# Patient Record
Sex: MALE | Race: WHITE
[De-identification: names, ages, dates, MRNs, and addresses within clinical notes are randomized per-mention and may not be internally consistent; named-entity substitution may affect disease eponyms.]

---

## 2017-10-10 ENCOUNTER — HOSPITAL ENCOUNTER (EMERGENCY)
Dept: HOSPITAL 47 - EC | Age: 22
Discharge: HOME | End: 2017-10-10
Payer: COMMERCIAL

## 2017-10-10 VITALS
RESPIRATION RATE: 18 BRPM | SYSTOLIC BLOOD PRESSURE: 107 MMHG | HEART RATE: 59 BPM | TEMPERATURE: 97.4 F | DIASTOLIC BLOOD PRESSURE: 71 MMHG

## 2017-10-10 DIAGNOSIS — F17.200: ICD-10-CM

## 2017-10-10 DIAGNOSIS — T81.33XA: Primary | ICD-10-CM

## 2017-10-10 DIAGNOSIS — Z88.0: ICD-10-CM

## 2017-10-10 PROCEDURE — 99282 EMERGENCY DEPT VISIT SF MDM: CPT
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
## 2018-02-06 ENCOUNTER — HOSPITAL ENCOUNTER (EMERGENCY)
Dept: HOSPITAL 47 - EC | Age: 23
Discharge: HOME | End: 2018-02-06
Payer: COMMERCIAL

## 2018-02-06 VITALS
SYSTOLIC BLOOD PRESSURE: 121 MMHG | TEMPERATURE: 97 F | HEART RATE: 96 BPM | DIASTOLIC BLOOD PRESSURE: 69 MMHG | RESPIRATION RATE: 16 BRPM
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
## 2018-02-06 DIAGNOSIS — Z88.0: ICD-10-CM

## 2018-02-06 DIAGNOSIS — Z23: ICD-10-CM

## 2018-02-06 DIAGNOSIS — S81.812A: Primary | ICD-10-CM

## 2018-02-06 DIAGNOSIS — Z87.891: ICD-10-CM

## 2018-02-06 PROCEDURE — 12001 RPR S/N/AX/GEN/TRNK 2.5CM/<: CPT

## 2018-02-06 PROCEDURE — 90715 TDAP VACCINE 7 YRS/> IM: CPT

## 2018-02-06 PROCEDURE — 99283 EMERGENCY DEPT VISIT LOW MDM: CPT

## 2018-02-06 PROCEDURE — 90471 IMMUNIZATION ADMIN: CPT

## 2018-02-06 NOTE — XR
EXAMINATION TYPE: XR tibia fibula LT

 

DATE OF EXAM: 2/6/2018

 

CLINICAL HISTORY: Left lower extremity pain

 

TECHNIQUE:  Two views of the left leg are obtained.

 

COMPARISON: None.

 

FINDINGS:  There is no acute fracture or dislocation seen in the left tibia or fibula.  The left knee
 and ankle joints appear within normal limits.  The overlying soft tissue appears unremarkable. Quest
ionable subcutaneous emphysema is seen anteriorly overlying the proximal to mid diaphysis of the tibi
a.

 

IMPRESSION:  There is no acute fracture or dislocation seen in the left tibia or fibula. Questionable
 soft tissue laceration is seen anteriorly of the mid tibia although this could be artifact as there 
is no focal soft tissue swelling.
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
## 2018-06-20 ENCOUNTER — HOSPITAL ENCOUNTER (EMERGENCY)
Dept: HOSPITAL 47 - EC | Age: 23
LOS: 1 days | Discharge: HOME | End: 2018-06-21
Payer: COMMERCIAL

## 2018-06-20 VITALS — RESPIRATION RATE: 18 BRPM

## 2018-06-20 DIAGNOSIS — S61.411A: Primary | ICD-10-CM

## 2018-06-20 DIAGNOSIS — Z88.0: ICD-10-CM

## 2018-06-20 DIAGNOSIS — S61.412A: ICD-10-CM
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
## 2018-06-20 DIAGNOSIS — Z23: ICD-10-CM

## 2018-06-20 DIAGNOSIS — S00.81XA: ICD-10-CM

## 2018-06-20 DIAGNOSIS — F17.200: ICD-10-CM

## 2018-06-20 PROCEDURE — 90471 IMMUNIZATION ADMIN: CPT

## 2018-06-20 PROCEDURE — 99284 EMERGENCY DEPT VISIT MOD MDM: CPT

## 2018-06-20 PROCEDURE — 90715 TDAP VACCINE 7 YRS/> IM: CPT
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
## 2018-06-20 NOTE — XR
EXAMINATION TYPE: XR hand complete bilateral

 

DATE OF EXAM: 6/20/2018

 

COMPARISON: NONE

 

HISTORY: Pain

 

TECHNIQUE: 6 views. 3 views each hand.

 

FINDINGS: I see no fracture nor dislocation. Joint spaces are normal. Metacarpals are intact.

 

IMPRESSION: Normal bilateral hand exam.

## 2018-06-21 VITALS — SYSTOLIC BLOOD PRESSURE: 114 MMHG | DIASTOLIC BLOOD PRESSURE: 72 MMHG | TEMPERATURE: 97 F | HEART RATE: 65 BPM

## 2019-03-15 ENCOUNTER — HOSPITAL ENCOUNTER (EMERGENCY)
Dept: HOSPITAL 47 - EC | Age: 24
Discharge: HOME | End: 2019-03-15
Payer: COMMERCIAL

## 2019-03-15 VITALS
TEMPERATURE: 98.2 F | SYSTOLIC BLOOD PRESSURE: 132 MMHG | HEART RATE: 85 BPM | RESPIRATION RATE: 18 BRPM | DIASTOLIC BLOOD PRESSURE: 79 MMHG

## 2019-03-15 DIAGNOSIS — F17.200: ICD-10-CM
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
## 2019-03-15 DIAGNOSIS — S61.411A: Primary | ICD-10-CM

## 2019-03-15 DIAGNOSIS — Z88.0: ICD-10-CM

## 2019-03-15 PROCEDURE — 73130 X-RAY EXAM OF HAND: CPT

## 2019-03-15 PROCEDURE — 99284 EMERGENCY DEPT VISIT MOD MDM: CPT

## 2019-03-15 PROCEDURE — 12002 RPR S/N/AX/GEN/TRNK2.6-7.5CM: CPT

## 2019-03-15 PROCEDURE — 99283 EMERGENCY DEPT VISIT LOW MDM: CPT

## 2019-03-15 RX ADMIN — LIDOCAINE HYDROCHLORIDE ONE ML: 10 INJECTION, SOLUTION INFILTRATION; PERINEURAL at 21:23
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
## 2019-03-15 NOTE — XR
EXAMINATION TYPE: XR hand complete RT

 

DATE OF EXAM: 3/15/2019

 

COMPARISON: NONE

 

HISTORY: Pain

 

TECHNIQUE: 3 views

 

FINDINGS: I see no fracture nor dislocation. Metacarpals are intact. Joint spaces are normal.

 

IMPRESSION: Negative right hand exam.

## 2023-08-23 ENCOUNTER — HOSPITAL ENCOUNTER (EMERGENCY)
Dept: HOSPITAL 47 - EC | Age: 28
Discharge: HOME | End: 2023-08-23
Payer: COMMERCIAL

## 2023-08-23 VITALS — RESPIRATION RATE: 18 BRPM | TEMPERATURE: 98.1 F

## 2023-08-23 VITALS — HEART RATE: 78 BPM | DIASTOLIC BLOOD PRESSURE: 74 MMHG | SYSTOLIC BLOOD PRESSURE: 120 MMHG

## 2023-08-23 DIAGNOSIS — R13.10: Primary | ICD-10-CM

## 2023-08-23 DIAGNOSIS — Z88.0: ICD-10-CM

## 2023-08-23 DIAGNOSIS — F17.200: ICD-10-CM

## 2023-08-23 LAB
ANION GAP SERPL CALC-SCNC: 7 MMOL/L
BASOPHILS # BLD AUTO: 0.1 K/UL (ref 0–0.2)
BASOPHILS NFR BLD AUTO: 1 %
BUN SERPL-SCNC: 15 MG/DL (ref 9–20)
CALCIUM SPEC-MCNC: 9.2 MG/DL (ref 8.4–10.2)
CHLORIDE SERPL-SCNC: 102 MMOL/L (ref 98–107)
CO2 SERPL-SCNC: 30 MMOL/L (ref 22–30)
EOSINOPHIL # BLD AUTO: 0.5 K/UL (ref 0–0.7)
EOSINOPHIL NFR BLD AUTO: 4 %
ERYTHROCYTE [DISTWIDTH] IN BLOOD BY AUTOMATED COUNT: 4.59 M/UL (ref 4.3–5.9)
ERYTHROCYTE [DISTWIDTH] IN BLOOD: 11.9 % (ref 11.5–15.5)
GLUCOSE SERPL-MCNC: 117 MG/DL (ref 74–99)
HCT VFR BLD AUTO: 42.4 % (ref 39–53)
HGB BLD-MCNC: 14.5 GM/DL (ref 13–17.5)
LYMPHOCYTES # SPEC AUTO: 2.7 K/UL (ref 1–4.8)
LYMPHOCYTES NFR SPEC AUTO: 21 %
MCH RBC QN AUTO: 31.7 PG (ref 25–35)
MCHC RBC AUTO-ENTMCNC: 34.3 G/DL (ref 31–37)
MCV RBC AUTO: 92.4 FL (ref 80–100)
MONOCYTES # BLD AUTO: 0.6 K/UL (ref 0–1)
MONOCYTES NFR BLD AUTO: 4 %
NEUTROPHILS # BLD AUTO: 9.2 K/UL (ref 1.3–7.7)
NEUTROPHILS NFR BLD AUTO: 70 %
PLATELET # BLD AUTO: 215 K/UL (ref 150–450)
POTASSIUM SERPL-SCNC: 3.8 MMOL/L (ref 3.5–5.1)
SODIUM SERPL-SCNC: 139 MMOL/L (ref 137–145)
WBC # BLD AUTO: 13.2 K/UL (ref 3.8–10.6)

## 2023-08-23 PROCEDURE — 80048 BASIC METABOLIC PNL TOTAL CA: CPT

## 2023-08-23 PROCEDURE — 85025 COMPLETE CBC W/AUTO DIFF WBC: CPT

## 2023-08-23 PROCEDURE — 71250 CT THORAX DX C-: CPT

## 2023-08-23 PROCEDURE — 99284 EMERGENCY DEPT VISIT MOD MDM: CPT

## 2023-08-23 PROCEDURE — 36415 COLL VENOUS BLD VENIPUNCTURE: CPT

## 2023-08-23 PROCEDURE — 70490 CT SOFT TISSUE NECK W/O DYE: CPT

## 2023-08-23 NOTE — CT
EXAM:

  CT Chest Without Intravenous Contrast

 

CLINICAL HISTORY:

  ITS.REASON CT Reason: DYSPHAGIA, odynophagia

 

TECHNIQUE:

  Axial computed tomography images of the chest without intravenous 

contrast.  CTDI is 5.3 mGy and DLP is 261.4 mGy-cm.  This CT exam was 

performed using one or more of the following dose reduction techniques: 

automated exposure control, adjustment of the mA and/or kV according to 

patient size, and/or use of iterative reconstruction technique.

 

COMPARISON:

  No relevant prior studies available.

 

FINDINGS: The study is limited secondary to lack of IV contrast.

 

  Lungs:  Unremarkable.  No mass.  No consolidation.

  Pleural space:  Unremarkable.  No pneumothorax.  No significant 

effusion.

  Heart:  Unremarkable.  No cardiomegaly.  No significant pericardial 

effusion.  No significant coronary artery calcifications.

  Bones/joints:  Unremarkable.  No acute fracture.  No dislocation.

  Soft tissues:  Unremarkable.

  Vasculature:  Unremarkable.  No thoracic aortic aneurysm.

  Lymph nodes:  Unremarkable.  No enlarged lymph nodes.

 

IMPRESSION:     

No evidence of acute thoracic pathology.

## 2023-08-23 NOTE — CT
EXAM:

  CT Neck Without Intravenous Contrast

 

CLINICAL HISTORY:

  ITS.REASON CT Reason: dysphagia and odynophagia, please include stomach

 

TECHNIQUE:

  Axial computed tomography images of the neck without intravenous 

contrast.  CTDI is 6.6 mGy and DLP is 267 mGy-cm.  This CT exam was 

performed using one or more of the following dose reduction techniques: 

automated exposure control, adjustment of the mA and/or kV according to 

patient size, and/or use of iterative reconstruction technique.

 

COMPARISON:

  No relevant prior studies available.

 

FINDINGS: This study is limited secondary to lack of IV contrast and tiny 

amount of subcutaneous fat.

 

  Oropharynx: Multiple faucial tonsillar crypts.  No significant 

tonsillar enlargement.

  Hypopharynx:  Unremarkable.

  Larynx:  Unremarkable.  Normal epiglottis.

  Trachea:  Unremarkable.

  Retropharyngeal space:  Unremarkable.

  Submandibular/parotid glands:  Unremarkable.  Glands are normal in size.

 

  Thyroid:  Unremarkable.  No enlarged or calcified nodules.

  Bones/joints:  No acute fracture. Extensive dental caries with 

periapical about tooth #18 and 19 concerning for periapical abscesses. 

  Soft tissues:  Unremarkable.

  Vasculature:  No acute findings.

  Lymph nodes: Prominent cervical lymph nodes.

  Lung apices:  Unremarkable as visualized.

 

IMPRESSION:     

Prominent cervical lymph nodes.  Otherwise, no evidence of acute cervical 

soft tissue pathology.  If there is continued clinical concern, a CT scan 

with contrast may be of benefit for further evaluation.

 

Extensive dental caries with periapical about tooth #18 and 19 concerning 

for periapical abscesses.  Recommend dental consult.

## 2023-08-23 NOTE — ED
General Adult HPI





- General


Chief complaint: ENT


Stated complaint: Nausea,Vomiting


Time Seen by Provider: 08/23/23 01:38


Source: EMS


Mode of arrival: EMS


Limitations: no limitations





- History of Present Illness


Initial comments: 


Dictation was produced using dragon dictation software. please excuse any 

grammatical, word or spelling errors. 











Chief Complaint: 28-year-old male presents with odynophagia





History of Present Illness: Patient 28-year-old male presents with painful 

swallowing.  States that whenever he tries to eat or drink fluids he vomits 

several minutes later.  Been having poor appetite.  States that it feels that it

hurts whenever he swallows down in his chest and upper abdomen.  Denies any 

medical history.  No fever, chills or night sweats.








The ROS documented in this emergency department record has been reviewed and 

confirmed by me.  Those systems with pertinent positive or negative responses 

have been documented in the HPI.  All other systems are other negative and/or 

noncontributory.

















- Related Data


                                  Previous Rx's











 Medication  Instructions  Recorded


 


Ibuprofen [Motrin] 600 mg PO Q8HR PRN #30 tab 10/10/17


 


Ibuprofen [Motrin] 600 mg PO Q8HR PRN #30 tab 06/21/18


 


Albuterol Inhaler [Ventolin Hfa 1 - 2 puff INHALATION Q4-6H PRN #1 03/24/19





Inhaler] inhaler 


 


Doxycycline [Vibramycin] 100 mg PO BID 7 Days #14 capsule 03/24/19


 


methylPREDNISolone Dose Pack 4 mg PO AS DIRECTED #21 package 03/24/19





[Medrol Dose Pack]  











                                    Allergies











Allergy/AdvReac Type Severity Reaction Status Date / Time


 


Penicillins Allergy  Unknown Verified 08/23/23 01:36














Review of Systems


ROS Statement: 


Those systems with pertinent positive or pertinent negative responses have been 

documented in the HPI.





ROS Other: All systems not noted in ROS Statement are negative.





Past Medical History


Past Medical History: No Reported History


History of Any Multi-Drug Resistant Organisms: None Reported


Past Surgical History: No Surgical Hx Reported


Past Psychological History: No Psychological Hx Reported


Smoking Status: Current every day smoker


Past Alcohol Use History: Occasional


Past Drug Use History: None Reported





General Exam





- General Exam Comments


Initial Comments: 








PHYSICAL EXAM:


General Impression: Alert and oriented x3, not in acute distress


HEENT: Normocephalic atraumatic, extra-ocular movements intact, pupils equal and

reactive to light bilaterally, mucous membranes moist.


Cardiovascular: Heart regular rate and rhythm


Chest: Able to complete full sentences, no retractions, no tachypnea


Abdomen: abdomen soft, non-tender, non-distended, no organomegaly


Musculoskeletal: Pulses present and equal in all extremities, no peripheral 

edema


Motor:  no focal deficits noted


Neurological: CN II-XII grossly intact, no focal motor or sensory deficits noted


Skin: Intact with no visualized rashes


Psych: Normal affect and mood











Limitations: no limitations





Course


                                   Vital Signs











  08/23/23 08/23/23 08/23/23





  01:34 02:52 04:50


 


Temperature 98.1 F  


 


Pulse Rate 67 71 74


 


Respiratory 18 18 18





Rate   


 


Blood Pressure 101/68 122/68 118/79


 


O2 Sat by Pulse 99 98 99





Oximetry   














Medical Decision Making





- Medical Decision Making


Was pt. sent in by a medical professional or institution (, PA, NP, urgent 

care, hospital, or nursing home...) When possible be specific


@  -No


Did you speak to anyone other than the patient for history (EMS, parent, family,

police, friend...)? What history was obtained from this source 


@  -No


Did you review nursing and triage notes (agree or disagree)?  Why? 


@  -I reviewed and agree with nursing and triage notes


Were old charts reviewed (outside hosp., previous admission, EMS record, old 

EKG, old radiological studies, urgent care reports/EKG's, nursing home records)?

Report findings 


@  -No old charts were reviewed


Differential Diagnosis (chest pain, altered mental status, abdominal pain women,

abdominal pain men, vaginal bleeding, musculoskeletal, weakness, fever, dyspnea,

syncope, headache, dizziness, GI bleed, back pain, seizure, CVA, palpatations, 

mental health)? 


@  -not applicable


EKG interpreted by me (3pts min.).


@  -None done


X-rays interpreted by me (1pt min.).


@  -None done


CT interpreted by me (1pt min.).


@  -CT soft tissue neck and CT chest did not show any obvious cause for swollen 

if Cold-Eeze


U/S interpreted by me (1pt. min.).


@  -None done


What testing was considered but not performed or refused? (CT, X-rays, U/S, 

labs)? Why?


@  -None


What meds were considered but not given or refused? Why?


@  -None


Did you discuss the management of the patient with other professionals (

professionals i.e. , PA, NP, lab, RT, psych nurse, , , 

teacher, , )? Give summary


@  -No


Was smoking cessation discussed for >3mins.?


@  -No


Was critical care preformed (if so, how long)?


@  -No


Were there social determinants of health that impacted care today? How? 

(Homelessness, low income, unemployed, alcoholism, drug addiction, transpo

rtation, low edu. Level, literacy, decrease access to med. care, [facility], rehab)?


@  -No


Was there de-escalation of care discussed even if they declined (Discuss DNR or 

withdrawal of care, Hospice)? DNR status


@  -No


What co-morbidities impacted this encounter? (DM, HTN, Smoking, COPD, CAD, 

Cancer, CVA, ARF, Chemo, Hep., AIDS, mental health diagnosis, sleep apnea, 

morbid obesity)?


@  -None


Was patient admitted / discharged? Hospital course, mention meds given and 

route, prescriptions, significant lab abnormalities, going to OR and other 

pertinent info.


@  -28-year-old male presents to the ER for swallowing difficulties.  His 

symptoms have been ongoing for 2-3 days.  Vital signs stable.  Patient no acute 

distress.  He is tolerating chips at the bedside.  Patient denies any history of

food bolus impaction.  GI cocktail provided with no relief of his symptoms.  La

bs are unremarkable.  Imaging studies were obtained showing no obvious cause of 

his symptoms.  Incidental finding of dental infections with inflamed lymph 

nodes.  Patient told of this finding.  Disposition options were discussed.  His 

agreeable for discharge with outpatient GI referral.


Undiagnosed new problem with uncertain prognosis?


@  -No


Drug Therapy requiring intensive monitoring for toxicity (Heparin, Nitro, 

Insulin, Cardizem)?


@  -No


Were any procedures done?


@  -No


Diagnosis/symptom?  Acute, or Chronic, or Acute on Chronic?  Uncomplicated 

(without systemic symptoms) or Complicated (systemic symptoms)?


@  -1.  Dysphagia


Side effects of treatment?


@  -No


Exacerbation, Progression, or Severe Exacerbation?


@  -No


Poses a threat to life or bodily function? How? (Chest pain, USA, MI, pneumonia,

PE, COPD, DKA, ARF, appy, cholecystitis, CVA, Diverticulitis, Homicidal, 

Suicidal, threat to staff... and all critical care pts)


@  -No








- Lab Data


Result diagrams: 


                                 08/23/23 02:38





                                 08/23/23 02:38


                                   Lab Results











  08/23/23 08/23/23 Range/Units





  02:38 02:38 


 


WBC  13.2 H   (3.8-10.6)  k/uL


 


RBC  4.59   (4.30-5.90)  m/uL


 


Hgb  14.5   (13.0-17.5)  gm/dL


 


Hct  42.4   (39.0-53.0)  %


 


MCV  92.4   (80.0-100.0)  fL


 


MCH  31.7   (25.0-35.0)  pg


 


MCHC  34.3   (31.0-37.0)  g/dL


 


RDW  11.9   (11.5-15.5)  %


 


Plt Count  215   (150-450)  k/uL


 


MPV  7.9   


 


Neutrophils %  70   %


 


Lymphocytes %  21   %


 


Monocytes %  4   %


 


Eosinophils %  4   %


 


Basophils %  1   %


 


Neutrophils #  9.2 H   (1.3-7.7)  k/uL


 


Lymphocytes #  2.7   (1.0-4.8)  k/uL


 


Monocytes #  0.6   (0-1.0)  k/uL


 


Eosinophils #  0.5   (0-0.7)  k/uL


 


Basophils #  0.1   (0-0.2)  k/uL


 


Sodium   139  (137-145)  mmol/L


 


Potassium   3.8  (3.5-5.1)  mmol/L


 


Chloride   102  ()  mmol/L


 


Carbon Dioxide   30  (22-30)  mmol/L


 


Anion Gap   7  mmol/L


 


BUN   15  (9-20)  mg/dL


 


Creatinine   0.79  (0.66-1.25)  mg/dL


 


Est GFR (CKD-EPI)AfAm   >90  (>60 ml/min/1.73 sqM)  


 


Est GFR (CKD-EPI)NonAf   >90  (>60 ml/min/1.73 sqM)  


 


Glucose   117 H  (74-99)  mg/dL


 


Calcium   9.2  (8.4-10.2)  mg/dL














Disposition


Clinical Impression: 


 Dysphagia





Disposition: HOME SELF-CARE


Condition: Fair


Instructions (If sedation given, give patient instructions):  Dysphagia (ED)


Additional Instructions: 


Seek immediate medical attention if her symptoms acutely worsened, especially if

having worsening pain and/or inability to tolerate saliva/secretions


Is patient prescribed a controlled substance at d/c from ED?: No


Referrals: 


Halie Carlos MD [STAFF PHYSICIAN] - 1-2 days


Time of Disposition: 06:16